# Patient Record
Sex: MALE | Race: WHITE | Employment: OTHER | URBAN - METROPOLITAN AREA
[De-identification: names, ages, dates, MRNs, and addresses within clinical notes are randomized per-mention and may not be internally consistent; named-entity substitution may affect disease eponyms.]

---

## 2018-10-17 ENCOUNTER — HOSPITAL ENCOUNTER (OUTPATIENT)
Dept: GENERAL RADIOLOGY | Age: 78
Discharge: HOME | End: 2018-10-19
Payer: MEDICARE

## 2018-10-17 DIAGNOSIS — R06.02 BREATH, SHORTNESS: ICD-10-CM

## 2018-10-17 PROCEDURE — 71046 X-RAY EXAM CHEST 2 VIEWS: CPT

## 2018-11-10 ENCOUNTER — HOSPITAL ENCOUNTER (OUTPATIENT)
Dept: GENERAL RADIOLOGY | Age: 78
Discharge: HOME | End: 2018-11-12
Payer: MEDICARE

## 2018-11-10 ENCOUNTER — HOSPITAL ENCOUNTER (OUTPATIENT)
Dept: MRI IMAGING | Age: 78
Discharge: HOME | End: 2018-11-12
Payer: MEDICARE

## 2018-11-10 DIAGNOSIS — M96.1 POSTLAMINECTOMY SYNDROME, LUMBAR REGION: ICD-10-CM

## 2018-11-10 PROCEDURE — 72110 X-RAY EXAM L-2 SPINE 4/>VWS: CPT

## 2018-11-19 ENCOUNTER — HOSPITAL ENCOUNTER (OUTPATIENT)
Dept: GENERAL RADIOLOGY | Age: 78
Discharge: HOME | End: 2018-11-19

## 2018-11-19 DIAGNOSIS — S33.9XXA CHRONIC OR OLD SPRAIN OF LUMBOSACRAL LIGAMENT: ICD-10-CM

## 2025-06-01 ENCOUNTER — OFFICE VISIT (OUTPATIENT)
Dept: URGENT CARE | Age: 85
End: 2025-06-01
Payer: MEDICARE

## 2025-06-01 ENCOUNTER — ANCILLARY PROCEDURE (OUTPATIENT)
Dept: URGENT CARE | Age: 85
End: 2025-06-01
Payer: MEDICARE

## 2025-06-01 VITALS
BODY MASS INDEX: 22.48 KG/M2 | HEIGHT: 70 IN | SYSTOLIC BLOOD PRESSURE: 133 MMHG | TEMPERATURE: 98.8 F | DIASTOLIC BLOOD PRESSURE: 75 MMHG | RESPIRATION RATE: 20 BRPM | WEIGHT: 157 LBS | OXYGEN SATURATION: 98 % | HEART RATE: 81 BPM

## 2025-06-01 DIAGNOSIS — J06.9 VIRAL URI: ICD-10-CM

## 2025-06-01 DIAGNOSIS — M79.672 PAIN OF LEFT HEEL: ICD-10-CM

## 2025-06-01 DIAGNOSIS — J02.9 SORE THROAT: ICD-10-CM

## 2025-06-01 DIAGNOSIS — S90.32XA CONTUSION OF FOOT OR HEEL, LEFT, INITIAL ENCOUNTER: ICD-10-CM

## 2025-06-01 DIAGNOSIS — M79.672 PAIN OF LEFT HEEL: Primary | ICD-10-CM

## 2025-06-01 LAB
POC HUMAN RHINOVIRUS PCR: NEGATIVE
POC INFLUENZA A VIRUS PCR: NEGATIVE
POC INFLUENZA B VIRUS PCR: NEGATIVE
POC RESPIRATORY SYNCYTIAL VIRUS PCR: NEGATIVE
POC STREPTOCOCCUS PYOGENES (GROUP A STREP) PCR: NEGATIVE

## 2025-06-01 PROCEDURE — 73650 X-RAY EXAM OF HEEL: CPT | Mod: LEFT SIDE | Performed by: NURSE PRACTITIONER

## 2025-06-01 RX ORDER — PREDNISONE 20 MG/1
20 TABLET ORAL DAILY
Qty: 5 TABLET | Refills: 0 | Status: SHIPPED | OUTPATIENT
Start: 2025-06-01 | End: 2025-06-06

## 2025-06-01 RX ORDER — SPIRONOLACTONE 25 MG/1
25 TABLET ORAL
COMMUNITY
Start: 2025-03-25

## 2025-06-01 RX ORDER — CETIRIZINE HYDROCHLORIDE 10 MG/1
10 TABLET ORAL DAILY
Qty: 30 TABLET | Refills: 0 | Status: SHIPPED | OUTPATIENT
Start: 2025-06-01

## 2025-06-01 RX ORDER — AMLODIPINE BESYLATE AND OLMESARTAN MEDOXOMIL 5; 40 MG/1; MG/1
1 TABLET ORAL NIGHTLY
COMMUNITY
Start: 2025-03-25

## 2025-06-01 ASSESSMENT — ENCOUNTER SYMPTOMS: SORE THROAT: 1

## 2025-06-01 NOTE — PATIENT INSTRUCTIONS
Sore Throat:  - No difficulty swallowing   - SPOTFIRE Strep negative; throw out toothbrush in the next 4 days to prevent re-infection   - Advised on possible differentials  - Good oral hydration to prevent dehydration   - Warm salt gargles; Cepacol drops/spray OTC; daily antihistamine to dry up postnasal discharge; prednisone  - Advised on s/s to seek emergent care for   - Tylenol/Motrin as needed for pain or fever   - Follow-up with PCP in the next few days if no better    Contusion to Heel:  - Pad the heel in shoes  - Wear good supportive shoes  - Ice and elevation  - Avoid any activity that will worsen pain  - Advised on heeling time

## 2025-06-01 NOTE — PROGRESS NOTES
"Subjective   Patient ID: Vasile Chang is a 84 y.o. male. They present today with a chief complaint of Sore Throat (LT side has been about 4 days. When swalloing ).    History of Present Illness  Pt presents to the  with the c/o ST x 4 days. States left side hurts worse than the right. No difficulty swallowing, just painful to swallow. No other associated symptoms or concerns. Has not taken anything for symptoms.   Pt also here for left heel pain. Pt states was trying shut a heavy steel door and had to lay down on back and \"stomp\" the door shut. Pain to the bottom left heel. Pain with walking on uneven surfaces; no pain on palpation. No other injury; no other associated symptoms or concerns to address.       Sore Throat         Past Medical History  Allergies as of 06/01/2025 - Reviewed 06/01/2025   Allergen Reaction Noted    Amlodipine Swelling 01/18/2024    Hydralazine Swelling 01/18/2024    Nifedipine Swelling 01/18/2024    Triamterene-hydrochlorothiazid Swelling 01/18/2024       Prescriptions Prior to Admission[1]     Medical History[2]    Surgical History[3]     reports that he has never smoked. He has never used smokeless tobacco.    Review of Systems  Review of Systems   HENT:  Positive for sore throat.      10 point ROS completed and all are negative other than what is stated in the current HPI                               Objective    Vitals:    06/01/25 0833   BP: 133/75   Pulse: 81   Resp: 20   Temp: 37.1 °C (98.8 °F)   SpO2: 98%   Weight: 71.2 kg (157 lb)   Height: 1.778 m (5' 10\")     No LMP for male patient.    Physical Exam  Vitals and nursing note reviewed.   Constitutional:       Appearance: Normal appearance.   HENT:      Head: Normocephalic and atraumatic.      Nose: Nose normal.      Mouth/Throat:      Mouth: Mucous membranes are moist.      Pharynx: Posterior oropharyngeal erythema present.      Comments: (+)postnasal discharge  Cardiovascular:      Rate and Rhythm: Normal rate and " regular rhythm.   Pulmonary:      Effort: Pulmonary effort is normal.      Breath sounds: Normal breath sounds.   Musculoskeletal:         General: Signs of injury present. No swelling, tenderness or deformity.      Cervical back: No tenderness.      Left lower leg: No edema.      Comments: Left heel pain with walking on uneven surfaces; no abnormalities noted on exam   Lymphadenopathy:      Cervical: No cervical adenopathy.   Skin:     General: Skin is warm and dry.      Capillary Refill: Capillary refill takes less than 2 seconds.   Neurological:      Mental Status: He is alert and oriented to person, place, and time.   Psychiatric:         Behavior: Behavior normal.         Procedures    Point of Care Test & Imaging Results from this visit  Results for orders placed or performed in visit on 06/01/25   POCT SPOTFIRE R/ST Panel Mini w/Strep A ([facility]) manually resulted   Result Value Ref Range    POC Group A Strep, PCR Negative Negative    POC Respiratory Syncytial Virus PCR Negative Negative    POC Influenza A Virus PCR Negative Negative    POC Influenza B Virus PCR Negative Negative    POC Human Rhinovirus PCR Negative Negative      Imaging  XR calcaneus left 2 views  Result Date: 6/1/2025  No left calcaneal fracture identified.   MACRO: None   Signed by: Al Ndiaye 6/1/2025 9:41 AM Dictation workstation:   [id number]      Cardiology, Vascular, and Other Imaging  No other imaging results found for the past 2 days      Diagnostic study results (if any) were reviewed by SURJIT Cristina.    Assessment/Plan   Allergies, medications, history, and pertinent labs/EKGs/Imaging reviewed by SURJIT Cristina.     Medical Decision Making  Sore Throat:  - No difficulty swallowing   - SPOTFIRE Strep negative; throw out toothbrush in the next 4 days to prevent re-infection   - Advised on possible differentials  - Good oral hydration to prevent dehydration   - Warm salt gargles; Cepacol  drops/spray OTC; daily antihistamine to dry up postnasal discharge; prednisone  - Advised on s/s to seek emergent care for   - Tylenol/Motrin as needed for pain or fever   - Follow-up with PCP in the next few days if no better    Contusion to Heel:  - Pad the heel in shoes  - Wear good supportive shoes  - Ice and elevation  - Avoid any activity that will worsen pain  - Advised on heeling time    Orders and Diagnoses  Diagnoses and all orders for this visit:  Pain of left heel  -     XR calcaneus left 2 views; Future  Sore throat  -     POCT SPOTFIRE R/ST Panel Mini w/Strep A ([facility]) manually resulted  -     predniSONE (Deltasone) 20 mg tablet; Take 1 tablet (20 mg) by mouth once daily for 5 days.  -     cetirizine (ZyrTEC) 10 mg tablet; Take 1 tablet (10 mg) by mouth once daily.  Viral URI  -     POCT SPOTFIRE R/ST Panel Mini w/Strep A ([facility] [employer]) manually resulted  Contusion of foot or heel, left, initial encounter      Medical Admin Record      Patient disposition: Home    Electronically signed by SURJIT Cristina  9:47 AM           [1] (Not in a hospital admission)   [2] History reviewed. No pertinent past medical history.  [3] History reviewed. No pertinent surgical history.

## 2025-06-06 ENCOUNTER — APPOINTMENT (OUTPATIENT)
Dept: OTOLARYNGOLOGY | Facility: CLINIC | Age: 85
End: 2025-06-06
Payer: MEDICARE